# Patient Record
Sex: MALE | Race: WHITE
[De-identification: names, ages, dates, MRNs, and addresses within clinical notes are randomized per-mention and may not be internally consistent; named-entity substitution may affect disease eponyms.]

---

## 2017-10-25 NOTE — DIAGNOSTIC IMAGING REPORT
PET/CT SKULL-THIGH



HISTORY: Lymphoma  LYMPHOMA



TECHNIQUE: PET/CT was performed from the base of the skull through the pelvis

following the intravenous administration of 15.2 mCi of F18-FDG. Non-contrast CT

imaging was performed over the same range without breath-hold for attenuation

correction of PET images and anatomic correlation, but not for primary

interpretation as it is not of standard diagnostic quality.



CT DOSE:    

 

COMPARISON: None, although prior studies per report are present.



FINDINGS:

 

HEAD AND NECK:  There is no FDG-avid disease or significant lymphadenopathy in

the imaged portions of the head and the neck.

 

CHEST:  There is no FDG-avid disease in the chest.  There is no axillary,

mediastinal, or hilar lymphadenopathy.  There is no pleural or pericardial

effusion.  There is no air-space disease or suspicious lung nodule.

 

ABDOMEN/PELVIS:  Below the diaphragm, tracer is distributed physiologically in

the gastrointestinal and genitourinary tracts. There is no significant

lymphadenopathy and no FDG-avid disease.

 

MUSCULOSKELETAL:  There is no FDG-avid or destructive bone lesion.

 

IMPRESSION:

There is no definite evidence of recurrent FDG-avid disease. Despite absence of

prior studies for comparison, the current study shows no evidence for

metabolically active adenopathy.









The above report was generated using voice recognition software.  It may contain

grammatical, syntax or spelling errors.







Electronically signed by:  Abdiel Guzman M.D.

10/25/2017 10:17 AM



Dictated Date/Time:  10/25/2017 10:08 AM

## 2017-11-15 ENCOUNTER — HOSPITAL ENCOUNTER (OUTPATIENT)
Dept: HOSPITAL 45 - C.MRI | Age: 52
Discharge: HOME | End: 2017-11-15
Attending: INTERNAL MEDICINE
Payer: COMMERCIAL

## 2017-11-15 DIAGNOSIS — C82.18: Primary | ICD-10-CM

## 2017-11-15 NOTE — DIAGNOSTIC IMAGING REPORT
MRI OF THE BRAIN WITHOUT AND WITH IV CONTRAST



CLINICAL HISTORY: LYMPHOMA    



COMPARISON STUDY:  PET/CT scan dated 10/25/2017 



TECHNIQUE: MRI of the brain was performed from the vertex to the skull base

utilizing various T1 and T2 weighted sequences. Following the IV administration

of 9.5 mL of Gadavist contrast, additional enhanced images were obtained.



FINDINGS: 

Sagittal T1, axial diffusion, proton density and T2 weighted axial, coronal

FLAIR, and pre and post axial T1-weighted images were acquired. These were

supplemented with post gadolinium coronal T1 weighted images. 

No intra or extra-axial mass lesions are visualized.

Axial diffusion-weighted images reveal no evidence of acute or subacute

infarction.

There is no evidence of ventricular dilatation.

Proton density T2-weighted and FLAIR images reveal no significant

intraparenchymal signal abnormalities.

There are no abnormal flow voids.

There is no evidence of pathologic enhancement.





IMPRESSION:  Normal MRI of the brain for age. No evidence of intracranial

metastasis.







Electronically signed by:  Cheko Garcia M.D.

11/15/2017 10:11 AM



Dictated Date/Time:  11/15/2017 10:08 AM

## 2018-03-15 ENCOUNTER — HOSPITAL ENCOUNTER (OUTPATIENT)
Dept: HOSPITAL 45 - C.CTS | Age: 53
Discharge: HOME | End: 2018-03-15
Attending: INTERNAL MEDICINE
Payer: COMMERCIAL

## 2018-03-15 DIAGNOSIS — C82.18: Primary | ICD-10-CM

## 2018-03-15 NOTE — DIAGNOSTIC IMAGING REPORT
HEAD WITH CONTRAST (CT)



CLINICAL HISTORY: LYMPHOMA nodule



TECHNIQUE: Transaxial acquisition post contrast administration



COMPARISON STUDY:  None



FINDINGS: Normal density characteristics of the cerebellar as well as cerebral

hemispheres. Ventricular system is midline. No evidence for abnormal

postcontrast enhancement.



IMPRESSION:  Negative study 









The above report was generated using voice recognition software.  It may contain

grammatical, syntax or spelling errors.







Electronically signed by:  Abdiel Guzman M.D.

3/15/2018 3:43 PM



Dictated Date/Time:  3/15/2018 3:41 PM

## 2018-03-15 NOTE — DIAGNOSTIC IMAGING REPORT
CT SCAN OF THE NECK WITH IV CONTRAST



CLINICAL HISTORY: Lymphoma.



COMPARISON STUDY:  PET CT dated 10/25/2017.



TECHNIQUE: Following the IV administration of 93 cc of Optiray 320, CT scan of

the soft tissues of the neck was performed from the skull base to the upper

chest. Images are reviewed in the axial, sagittal, and coronal planes.  IV

contrast was administered without complication.  A dose lowering technique was

utilized adhering to the principles of ALARA.



FINDINGS:



Pharynx: The nasopharynx, oropharynx, and laryngeal pharynx are normal in

appearance. The pharyngeal airway is widely patent. There is no evidence of mass

lesion. The vocal cords are symmetric. The parapharyngeal fat is well

maintained. The prevertebral/retropharyngeal soft tissues are within normal

limits.



Lymphadenopathy: There is bulky bilateral cervical adenopathy, which has

significantly increased from 10/25/2017. The largest nodes are seen in the left

cervical radicular region. A node on image #203 measures 3.9 x 2.0 cm, and a

more superior node in the left inferior cervical region image #188 measures 3.2

x 2.4 cm. There are numerous enlarged right cervical lymph nodes. The largest is

seen on image #198 and measures 2.9 x 1.5 cm. Bulky axillary adenopathy is

partially visualized. The largest axillary node is seen on the right on image

#246 and measures 3.3 x 2.2 cm. No lymph nodes are identified in the superior

mediastinum.



Thyroid: Normal in size and attenuation. A subcentimeter low-attenuation nodule

is seen in the left lobe.



Salivary glands: The parotid and submandibular glands are within normal limits.



Brain parenchyma: The visualized brain parenchyma at the skull base is normal in

appearance.



Vascular structures: There is atherosclerotic calcification of the carotid

bulbs. The carotid arteries and jugular veins are widely patent. A left

subclavian central venous infusion port is in place. 



Skeletal structures: The skeletal structures are osteopenic. Imaged portions of

the calvarium at the skull base are within normal limits. The cervical spine

appears intact noting multilevel spondylosis..



Sinuses and mastoids: The visualized paranasal sinuses are clear. The mastoid

air cells are well pneumatized.



Lung apices: Visualized apical lung parenchyma is clear.





IMPRESSION:



1. There is bulky cervical and axillary lymphadenopathy as above. This has

markedly increased as compared to 10/25/2017 and is consistent with the reported

history of lymphoma.



2. The pharyngeal soft tissues are normal in appearance.







Electronically signed by:  Oli Walton M.D.

3/15/2018 3:58 PM



Dictated Date/Time:  3/15/2018 3:53 PM

## 2018-03-16 ENCOUNTER — HOSPITAL ENCOUNTER (OUTPATIENT)
Dept: HOSPITAL 45 - C.RAD1850 | Age: 53
Discharge: HOME | End: 2018-03-16
Attending: SURGERY
Payer: COMMERCIAL

## 2018-03-16 DIAGNOSIS — Z01.811: Primary | ICD-10-CM

## 2018-03-16 DIAGNOSIS — C85.90: ICD-10-CM

## 2018-03-16 DIAGNOSIS — R59.0: ICD-10-CM

## 2018-03-16 NOTE — DIAGNOSTIC IMAGING REPORT
CHEST 2 VIEWS ROUTINE



CLINICAL HISTORY: Lymphoma. Preoperative evaluation.    



COMPARISON STUDY:  PET/CT October 25, 2017.



FINDINGS: A left subclavian Jthazr-l-Upci is in place. There is no pneumothorax

or pleural effusion. There is no consolidation to suggest pneumonia. Pulmonary

vascularity is normal. Cardiomediastinal silhouette is unremarkable. 



IMPRESSION:  No acute cardiopulmonary findings. 









Electronically signed by:  Saroj Quinteros M.D.

3/16/2018 10:57 AM



Dictated Date/Time:  3/16/2018 10:56 AM

## 2018-03-21 ENCOUNTER — HOSPITAL ENCOUNTER (OUTPATIENT)
Dept: HOSPITAL 45 - C.ACU | Age: 53
Discharge: HOME | End: 2018-03-21
Attending: SURGERY
Payer: COMMERCIAL

## 2018-03-21 VITALS
DIASTOLIC BLOOD PRESSURE: 69 MMHG | HEART RATE: 90 BPM | SYSTOLIC BLOOD PRESSURE: 116 MMHG | OXYGEN SATURATION: 95 % | TEMPERATURE: 98.78 F

## 2018-03-21 VITALS
HEART RATE: 94 BPM | TEMPERATURE: 98.78 F | OXYGEN SATURATION: 95 % | DIASTOLIC BLOOD PRESSURE: 69 MMHG | SYSTOLIC BLOOD PRESSURE: 115 MMHG

## 2018-03-21 VITALS
DIASTOLIC BLOOD PRESSURE: 64 MMHG | TEMPERATURE: 98.06 F | OXYGEN SATURATION: 96 % | SYSTOLIC BLOOD PRESSURE: 125 MMHG | HEART RATE: 90 BPM

## 2018-03-21 VITALS
HEIGHT: 70.98 IN | WEIGHT: 213.45 LBS | BODY MASS INDEX: 29.88 KG/M2 | BODY MASS INDEX: 29.88 KG/M2 | HEIGHT: 70.98 IN | WEIGHT: 213.45 LBS

## 2018-03-21 DIAGNOSIS — E11.9: ICD-10-CM

## 2018-03-21 DIAGNOSIS — C83.31: Primary | ICD-10-CM

## 2018-03-21 DIAGNOSIS — Z83.3: ICD-10-CM

## 2018-03-21 DIAGNOSIS — Z82.49: ICD-10-CM

## 2018-03-21 DIAGNOSIS — I10: ICD-10-CM

## 2018-03-21 DIAGNOSIS — F17.210: ICD-10-CM

## 2018-03-21 DIAGNOSIS — Z79.82: ICD-10-CM

## 2018-03-21 DIAGNOSIS — Z88.0: ICD-10-CM

## 2018-03-21 DIAGNOSIS — I70.90: ICD-10-CM

## 2018-03-21 NOTE — HISTORY & PHYSICAL BRIDGE NOTE
H&P Re-Evaluation


Bridge Note:


I have examined the patient, reviewed the History & Physical and in the 

interval since the performance of the History & Physical I have noted the 

following changes of clinical significance: Patient confirms he is not taking 

plavix.  Receiving insulin for elevated glucose this AM.  No other changes noted

## 2018-03-21 NOTE — ANESTHESIOLOGY PROGRESS NOTE
Anesthesia Post Op Note


Date & Time


Mar 21, 2018 at 10:21





Vital Signs


Pain Intensity:  0





Vital Signs Past 12 Hours








  Date Time  Temp Pulse Resp B/P (MAP) Pulse Ox O2 Delivery O2 Flow Rate FiO2


 


3/21/18 10:15  93 16 122/70 97 Oxymask 3 


 


3/21/18 10:05  88 18 107/68 96 Oxymask 5 


 


3/21/18 09:55  89 18 103/69 96 Oxymask 5 


 


3/21/18 09:48 36.1 92 12 105/66 97 Oxymask 5 


 


3/21/18 07:00 36.7 90 18 125/64 (84) 96 Room Air  











Notes


Mental Status:  alert / awake / arousable, participated in evaluation


Pt Amnestic to Procedure:  Yes


Nausea / Vomiting:  adequately controlled


Pain:  adequately controlled


Airway Patency, RR, SpO2:  stable & adequate


BP & HR:  stable & adequate


Hydration State:  stable & adequate


Anesthetic Complications:  no major complications apparent

## 2018-03-21 NOTE — [facility] POST OPERATIVE BRIEF NOTE
Immediate Operative Summary


Operative Date


Mar 21, 2018.





Pre-Operative Diagnosis





Lymphadenopathy





Post-Operative Diagnosis





Lymphadenopathy





Procedure(s) Performed





Left Cervical Lymph Node Biopsy





Surgeon


Dr. Mccray





Assistant Surgeon(s)


Arya Ramírez PA-C





Estimated Blood Loss


7ML





Findings


Consistent with Post-Op Diagnosis


friable, matted lymph nodes





Specimens





A. Left supraclavicalar Lymph Nodes





Drains


None





Anesthesia Type


General





Complication(s)


none





Disposition


Accompanied Pt To Recover:  no


Disposition:  Recovery Room / PACU

## 2018-03-21 NOTE — [facility] OPERATIVE REPORT
Operative Report


Operative Date


Mar 21, 2018.





Pre-Operative Diagnosis





Lymphadenopathy, history of lymphoma





Post-Operative Diagnosis


Same





Procedure(s) Performed


Left cervical/supra clavicular lymph node biopsy





Surgeon


Dr. Mccray





Assistant Surgeon(s)


Arya Ramírez PA-C





Estimated Blood Loss


7ML





Findings


Multiple friable, matted lymph nodes.  One whole lymph node excised, another 

removed in multiple fragments.  Good hemostasis.





Specimens





A. Left supraclavicalar Lymph Nodes





Drains


None





Anesthesia


General





Complication(s)


None





Disposition


Recovery Room / PACU





Indications


52-year-old male with history of lymphoma status post treatment, now with new 

left-sided cervical and supraclavicular lymphadenopathy, plan for left cervical 

lymph node biopsy.  The risks of the procedure were discussed, all questions 

were answered, and the patient agreed to proceed with surgery as planned.





Description of Procedure


The patient was properly identified, consented, and taken to the operating room 

where he was placed in the supine position.  General endotracheal anesthesia 

was induced.  SCDs and a safety belt were placed.  Preoperative antibiotics 

were administered.  The patient's neck and chest was prepped and draped in the 

standard sterile fashion.  Surgical timeout was performed and all parties were 

in agreement that this was the correct patient and procedure to be performed 

and we continued as planned.  





Local anesthetic was injected along the skin incision.  A oblique incision was 

made overlying the left cervical/supraclavicular lymph nodes and deepened down 

through the subcutaneous tissue with electrocautery.  The platysma was divided.

  Small injury to a branch of the internal jugular vein was closed with a figure

-of-eight 4-0 Prolene suture.  There were multiple friable, matted lymph nodes 

posterior and deep to the SCM.  One lymph node was removed in fragments and the 

other lymph node was removed in total.  There was a capsular tear to an 

additional lymph node that was controlled with cautery.  The lymph nodes were 

excised, and passed off the table as specimen and sent fresh to pathology.  The 

wound was irrigated and hemostasis was confirmed.  The platysma was closed with 

interrupted 3-0 Vicryl sutures, the skin was closed with interrupted 3-0 Vicryl 

deep dermal sutures, followed by 4-0 Monocryl running subcuticular suture.  

Dermabond was placed over the wound.  





The patient was extubated in the operating room and taken to the PACU where he 

recovered without apparent incident.  All sponge, instrument and needle counts 

were correct at the conclusion of the procedure.  The patient tolerated the 

procedure well.








The physician's assistant was present and scrubbed for the entirety of the 

case.  He was essential in positioning the patient, prepping and draping, 

retraction and exposure, excision lymph nodes, closure of the incision, 

placement of the dressings.


I attest to the content of the Intraoperative Record and any orders documented 

therein.  Any exceptions are noted below.

## 2018-04-09 ENCOUNTER — HOSPITAL ENCOUNTER (OUTPATIENT)
Dept: HOSPITAL 45 - C.PET | Age: 53
Discharge: HOME | End: 2018-04-09
Attending: INTERNAL MEDICINE
Payer: COMMERCIAL

## 2018-04-09 DIAGNOSIS — C82.18: Primary | ICD-10-CM

## 2018-04-09 NOTE — DIAGNOSTIC IMAGING REPORT
PET/CT



HISTORY:      LYMPHOMA



TECHNIQUE: PET/CT was performed from the base of the skull through the pelvis

following the intravenous administration of 13.0 mCi of F18-FDG. Non-contrast CT

imaging was performed over the same range without breath-hold for attenuation

correction of PET images and anatomic correlation, but not for primary

interpretation as it is not of standard diagnostic quality.



CT DOSE:    

 

COMPARISON: PET CT 10/25/2017.

CT neck 3/15/2018.



FINDINGS:

 

HEAD AND NECK:  Symmetric FDG uptake within the brain. Multiple enlarged and FDG

avid cervical and superior ventricular lymph nodes. The majority of the cervical

lymph nodes are within the lower neck. Dominant left cervical lymph node

measures 3.4 x 2.4 cm and demonstrates an SUV max of 8.0.

 

CHEST:  Multiple enlarged and FDG avid bilateral axillary lymph nodes, right

greater than left. Dominant right cervical lymph node measures 3.5 x 2.7 cm and

demonstrates an SUV max of 8.8. There are few FDG avid mediastinal lymph nodes

with the largest in the superior mediastinum measuring 11 mm and demonstrating

an SUV max of 3.2. Mildly enlarged retrocrural lymph nodes also demonstrate

abnormal FDG uptake with an SUV max of 2.1.

 

ABDOMEN/PELVIS:  The spleen is enlarged measuring 15 m in length. This also

demonstrates abnormal FDG uptake with an SUV max of 4. This is new compared to

the prior study. No FDG avid hepatic masses. FDG avid retroperitoneal and pelvic

lymphadenopathy, right greater than left. A few enlarged FDG avid inguinal lymph

nodes. Dominant right pelvic sidewall lymph node measures 5.2 x 3.2 cm and

demonstrates an SUV max of 6.0.

 

MUSCULOSKELETAL:  There is no FDG-avid or destructive bone lesion.

 

IMPRESSION:  

1. Significant increase in size within multiple FDG avid lymph nodes seen

throughout the neck, chest, abdomen, and pelvis as described above. This is

consistent with worsening lymphoma.

2. Increase in size with abnormal FDG uptake within the spleen.







Electronically signed by:  Conor Espinoza M.D.

4/9/2018 11:57 AM



Dictated Date/Time:  4/9/2018 11:45 AM

## 2018-04-25 ENCOUNTER — HOSPITAL ENCOUNTER (OUTPATIENT)
Dept: HOSPITAL 45 - C.RAD | Age: 53
Discharge: HOME | End: 2018-04-25
Attending: INTERNAL MEDICINE
Payer: COMMERCIAL

## 2018-04-25 DIAGNOSIS — C82.18: Primary | ICD-10-CM

## 2018-04-25 NOTE — DIAGNOSTIC IMAGING REPORT
A-PORT CHECK



CLINICAL HISTORY: LYMPHOMA malfunctioning A-Port catheter



COMPARISON STUDY:  No previous studies for comparison.



FINDINGS: 6 seconds of fluoroscopic time was utilized. 15 fluoroscopic spot

images were acquired.



The patient's left A-Port catheter is positioned with its tip in the superior

vena cava at the azygos level.



The port was accessed by the radiology nurse.



15 cc of Optiray 300 was instilled through the catheter.



There was no obstruction to the flow of contrast. There is no extravasation.

Contrast filled the superior vena cava. A tiny fibrin sheath at the tip of the

catheter is suspected.



IMPRESSION:  

1. Tiny fibrin sheath at the tip of the catheter

2. No evidence of obstruction to the antegrade flow of contrast. No evidence of

extravasation 









Electronically signed by:  Cheko Garcia M.D.

4/25/2018 3:14 PM



Dictated Date/Time:  4/25/2018 3:13 PM

## 2018-08-09 ENCOUNTER — HOSPITAL ENCOUNTER (OUTPATIENT)
Dept: HOSPITAL 45 - C.LABSPEC | Age: 53
Discharge: HOME | End: 2018-08-09
Attending: INTERNAL MEDICINE
Payer: COMMERCIAL

## 2018-08-09 ENCOUNTER — HOSPITAL ENCOUNTER (INPATIENT)
Dept: HOSPITAL 45 - C.EDB | Age: 53
LOS: 1 days | Discharge: HOME | DRG: 638 | End: 2018-08-10
Attending: NURSE PRACTITIONER | Admitting: HOSPITALIST
Payer: COMMERCIAL

## 2018-08-09 VITALS — OXYGEN SATURATION: 97 %

## 2018-08-09 VITALS
BODY MASS INDEX: 29.72 KG/M2 | WEIGHT: 212.31 LBS | HEIGHT: 71 IN | HEIGHT: 71 IN | BODY MASS INDEX: 29.72 KG/M2 | BODY MASS INDEX: 29.72 KG/M2 | WEIGHT: 212.31 LBS

## 2018-08-09 VITALS — OXYGEN SATURATION: 98 %

## 2018-08-09 VITALS
SYSTOLIC BLOOD PRESSURE: 95 MMHG | TEMPERATURE: 98.06 F | HEART RATE: 84 BPM | DIASTOLIC BLOOD PRESSURE: 61 MMHG | OXYGEN SATURATION: 97 %

## 2018-08-09 VITALS
OXYGEN SATURATION: 96 % | TEMPERATURE: 98.06 F | SYSTOLIC BLOOD PRESSURE: 106 MMHG | HEART RATE: 92 BPM | DIASTOLIC BLOOD PRESSURE: 62 MMHG

## 2018-08-09 VITALS
TEMPERATURE: 97.7 F | HEART RATE: 89 BPM | OXYGEN SATURATION: 98 % | DIASTOLIC BLOOD PRESSURE: 56 MMHG | SYSTOLIC BLOOD PRESSURE: 99 MMHG

## 2018-08-09 DIAGNOSIS — E83.42: ICD-10-CM

## 2018-08-09 DIAGNOSIS — I48.91: ICD-10-CM

## 2018-08-09 DIAGNOSIS — C85.90: ICD-10-CM

## 2018-08-09 DIAGNOSIS — F32.9: ICD-10-CM

## 2018-08-09 DIAGNOSIS — C82.18: Primary | ICD-10-CM

## 2018-08-09 DIAGNOSIS — N17.9: ICD-10-CM

## 2018-08-09 DIAGNOSIS — E11.65: Primary | ICD-10-CM

## 2018-08-09 DIAGNOSIS — E87.1: ICD-10-CM

## 2018-08-09 DIAGNOSIS — F17.200: ICD-10-CM

## 2018-08-09 DIAGNOSIS — I10: ICD-10-CM

## 2018-08-09 DIAGNOSIS — I48.92: ICD-10-CM

## 2018-08-09 DIAGNOSIS — E86.0: ICD-10-CM

## 2018-08-09 DIAGNOSIS — I95.9: ICD-10-CM

## 2018-08-09 LAB
ALBUMIN SERPL-MCNC: 3.4 GM/DL (ref 3.4–5)
ALBUMIN SERPL-MCNC: 3.5 GM/DL (ref 3.4–5)
ALP SERPL-CCNC: 93 U/L (ref 45–117)
ALP SERPL-CCNC: 95 U/L (ref 45–117)
ALT SERPL-CCNC: 37 U/L (ref 12–78)
ALT SERPL-CCNC: 38 U/L (ref 12–78)
AST SERPL-CCNC: 33 U/L (ref 15–37)
AST SERPL-CCNC: 35 U/L (ref 15–37)
BASOPHILS # BLD: 0.06 K/UL (ref 0–0.2)
BASOPHILS # BLD: 0.09 K/UL (ref 0–0.2)
BASOPHILS NFR BLD: 0.7 %
BASOPHILS NFR BLD: 1 %
BUN SERPL-MCNC: 16 MG/DL (ref 7–18)
BUN SERPL-MCNC: 19 MG/DL (ref 7–18)
BUN SERPL-MCNC: 20 MG/DL (ref 7–18)
CALCIUM SERPL-MCNC: 8.2 MG/DL (ref 8.5–10.1)
CALCIUM SERPL-MCNC: 9.7 MG/DL (ref 8.5–10.1)
CALCIUM SERPL-MCNC: 9.8 MG/DL (ref 8.5–10.1)
CO2 SERPL-SCNC: 19 MMOL/L (ref 21–32)
CO2 SERPL-SCNC: 24 MMOL/L (ref 21–32)
CO2 SERPL-SCNC: 25 MMOL/L (ref 21–32)
CREAT SERPL-MCNC: 1.01 MG/DL (ref 0.6–1.4)
CREAT SERPL-MCNC: 1.52 MG/DL (ref 0.6–1.4)
CREAT SERPL-MCNC: 1.58 MG/DL (ref 0.6–1.4)
EOS ABS #: 0.17 K/UL (ref 0–0.5)
EOS ABS #: 0.19 K/UL (ref 0–0.5)
EOSINOPHIL NFR BLD AUTO: 141 K/UL (ref 130–400)
EOSINOPHIL NFR BLD AUTO: 150 K/UL (ref 130–400)
GLUCOSE SERPL-MCNC: 103 MG/DL (ref 70–99)
GLUCOSE SERPL-MCNC: 464 MG/DL (ref 70–99)
GLUCOSE SERPL-MCNC: 542 MG/DL (ref 70–99)
HCT VFR BLD CALC: 43.1 % (ref 42–52)
HCT VFR BLD CALC: 44 % (ref 42–52)
HGB BLD-MCNC: 15.7 G/DL (ref 14–18)
HGB BLD-MCNC: 15.8 G/DL (ref 14–18)
IG#: 0.32 K/UL (ref 0–0.02)
IG#: 0.35 K/UL (ref 0–0.02)
IMM GRANULOCYTES NFR BLD AUTO: 14.9 %
IMM GRANULOCYTES NFR BLD AUTO: 9.1 %
LIPASE: 185 U/L (ref 73–393)
LYMPHOCYTES # BLD: 0.79 K/UL (ref 1.2–3.4)
LYMPHOCYTES # BLD: 1.4 K/UL (ref 1.2–3.4)
MCH RBC QN AUTO: 32.1 PG (ref 25–34)
MCH RBC QN AUTO: 32.6 PG (ref 25–34)
MCHC RBC AUTO-ENTMCNC: 35.9 G/DL (ref 32–36)
MCHC RBC AUTO-ENTMCNC: 36.4 G/DL (ref 32–36)
MCV RBC AUTO: 89.4 FL (ref 80–100)
MCV RBC AUTO: 89.4 FL (ref 80–100)
MONO ABS #: 0.38 K/UL (ref 0.11–0.59)
MONO ABS #: 1.04 K/UL (ref 0.11–0.59)
MONOCYTES NFR BLD: 11.9 %
MONOCYTES NFR BLD: 4 %
NEUT ABS #: 6.31 K/UL (ref 1.4–6.5)
NEUT ABS #: 7.02 K/UL (ref 1.4–6.5)
NEUTROPHILS # BLD AUTO: 1.8 %
NEUTROPHILS # BLD AUTO: 2.2 %
NEUTROPHILS NFR BLD AUTO: 72.4 %
NEUTROPHILS NFR BLD AUTO: 74.6 %
PMV BLD AUTO: 9.1 FL (ref 7.4–10.4)
PMV BLD AUTO: 9.5 FL (ref 7.4–10.4)
POTASSIUM SERPL-SCNC: 3.9 MMOL/L (ref 3.5–5.1)
POTASSIUM SERPL-SCNC: 4.3 MMOL/L (ref 3.5–5.1)
POTASSIUM SERPL-SCNC: 4.6 MMOL/L (ref 3.5–5.1)
PROT SERPL-MCNC: 6.5 GM/DL (ref 6.4–8.2)
PROT SERPL-MCNC: 6.8 GM/DL (ref 6.4–8.2)
RED CELL DISTRIBUTION WIDTH CV: 15 % (ref 11.5–14.5)
RED CELL DISTRIBUTION WIDTH CV: 15.2 % (ref 11.5–14.5)
RED CELL DISTRIBUTION WIDTH SD: 49.5 FL (ref 36.4–46.3)
RED CELL DISTRIBUTION WIDTH SD: 49.7 FL (ref 36.4–46.3)
SODIUM SERPL-SCNC: 127 MMOL/L (ref 136–145)
SODIUM SERPL-SCNC: 128 MMOL/L (ref 136–145)
SODIUM SERPL-SCNC: 138 MMOL/L (ref 136–145)
WBC # BLD AUTO: 8.71 K/UL (ref 4.8–10.8)
WBC # BLD AUTO: 9.41 K/UL (ref 4.8–10.8)

## 2018-08-09 RX ADMIN — MAGNESIUM SULFATE IN DEXTROSE SCH MLS/HR: 10 INJECTION, SOLUTION INTRAVENOUS at 15:48

## 2018-08-09 RX ADMIN — MAGNESIUM SULFATE IN DEXTROSE SCH MLS/HR: 10 INJECTION, SOLUTION INTRAVENOUS at 15:40

## 2018-08-09 RX ADMIN — INSULIN ASPART SCH UNITS: 100 INJECTION, SOLUTION INTRAVENOUS; SUBCUTANEOUS at 22:04

## 2018-08-09 RX ADMIN — SODIUM CHLORIDE SCH MLS/HR: 900 INJECTION, SOLUTION INTRAVENOUS at 14:45

## 2018-08-09 RX ADMIN — METOPROLOL TARTRATE SCH MG: 100 TABLET, FILM COATED ORAL at 20:26

## 2018-08-09 RX ADMIN — ALUMINUM ZIRCONIUM TRICHLOROHYDREX GLY SCH EA: 0.2 STICK TOPICAL at 23:54

## 2018-08-09 RX ADMIN — INSULIN ASPART SCH UNITS: 100 INJECTION, SOLUTION INTRAVENOUS; SUBCUTANEOUS at 18:15

## 2018-08-09 RX ADMIN — SODIUM CHLORIDE SCH MLS/HR: 900 INJECTION, SOLUTION INTRAVENOUS at 22:06

## 2018-08-09 RX ADMIN — ALUMINUM ZIRCONIUM TRICHLOROHYDREX GLY SCH EA: 0.2 STICK TOPICAL at 15:41

## 2018-08-09 RX ADMIN — APIXABAN SCH MG: 5 TABLET, FILM COATED ORAL at 20:27

## 2018-08-09 RX ADMIN — NICOTINE SCH PATCH: 21 PATCH, EXTENDED RELEASE TRANSDERMAL at 18:10

## 2018-08-09 RX ADMIN — RANOLAZINE SCH MG: 500 TABLET, FILM COATED, EXTENDED RELEASE ORAL at 20:27

## 2018-08-09 NOTE — EMERGENCY ROOM VISIT NOTE
History


Report prepared by Beena:  No Murillo


Under the Supervision of:  Dr. Elvin Mendez M.D.


First contact with patient:  10:35


Chief Complaint:  REFERRED BY DOCTOR


Stated Complaint:  SENT FROM CANCER CENTER, HIGH SUGAR, LOW BP





History of Present Illness


The patient is a 53 year old male who presents to the Emergency Room with 

complaints of an episode of abnormal lab values beginning today. He was seen by 

Dr. Henson in the cancer center today, who referred to the ED for low sodium

, low BP, and high blood sugar.  The patient states he is very tired, and 

denies nausea, palpitations, CP, SOB fever, cough, abdominal pain, pain with 

urination, or blood in urine or stools. He notes his Metoprolol dose was 

recently doubled, and that he takes 30 units of Lantus nightly.





   Source of History:  patient


   Onset:  today


   Position:  other (lab values)


   Quality:  other (low sodium, low BP, high sugar)


   Timing:  other (episode)


   Associated Symptoms:  No fevers, No chest pain, No SOB, No melena, No 

urinary symptoms


Note:


Associated symptom: very tired





Review of Systems


See HPI for pertinent positives and negatives.  A total of ten systems were 

reviewed and were otherwise negative.





Past Medical & Surgical


Medical Problems:


(1) Diabetes


(2) Hyperglycemia


(3) Lymphoma








Diabetes





Family History


No pertinent family history stated.





Social History


Smoking Status:  Current Every Day Smoker


Marital Status:  


Housing Status:  lives with family





Current/Historical Medications


Scheduled


Apixaban (Eliquis), 5 MG PO BID


Atorvastatin (Lipitor), 80 MG PO HS


Clopidogrel (Plavix), 75 MG PO DAILY


Glyburide-Metformin (Glucovance 5/500 Mg), 2 TAB PO BID


Insulin Detemir (Levemir), 30 UNITS SC DAILY


Isosorbide Mononitrate (Isosorbide Mononitrate ER), 30 MG PO DAILY


Linagliptin (Tradjenta), 5 MG PO DAILY


Metoprolol Tartrate (Lopressor) (Lopressor), 100 MG PO BID


Ranolazine (Ranexa), 500 MG PO BID


Valsartan/Hctz (Diovan Hct 160MG/25MG), 1 TAB PO DAILY


Venlafaxine Hcl (Effexor Extended Rel), 150 MG PO DAILY





Scheduled PRN


Ondansetron Hcl (Zofran), 1 TAB PO UD PRN for Nausea





Allergies


Coded Allergies:  


     Penicillins (Verified  Allergy, Unknown, UNKNOWN, 8/9/18)





Physical Exam


Vital Signs











  Date Time  Temp Pulse Resp B/P (MAP) Pulse Ox O2 Delivery O2 Flow Rate FiO2


 


8/9/18 13:35  86 18  98 Room Air  


 


8/9/18 13:31    97/61    


 


8/9/18 13:05  87 23     


 


8/9/18 12:35  85 24     


 


8/9/18 12:30  85 18  98 Room Air  


 


8/9/18 12:07     97 Room Air  


 


8/9/18 12:01    87/63    


 


8/9/18 12:00  94 17     


 


8/9/18 11:45  86  104/59    





  96  105/67    





  98  93/65    


 


8/9/18 11:37    93/65    


 


8/9/18 11:36    104/59    





    105/67    


 


8/9/18 11:31    97/57    


 


8/9/18 11:30  93 40     


 


8/9/18 11:00  103 23 84/47    


 


8/9/18 10:54  101      


 


8/9/18 10:31 36.3 60 20 73/45 97 Room Air  











Physical Exam


Physical Exam 


GENERAL:  He is oriented to person, place, and time. He appears well-developed 

and well-nourished. He does not appear distressed.


HENT:  Exam performed. 


   Head:  Normocephalic and atraumatic. 


   Right Ear:  External ear normal. No mastoid tenderness. 


   Left Ear: External ear normal. No mastoid tenderness. 


   Mouth/Throat:  The oropharynx is clear and moist. No trismus in the jaw. No 

dental abscesses or uvula swelling. No oropharyngeal exudate or tonsillar 

abscesses.


EYES: Conjunctivae and EOM are normal. Pupils are equal, round, and reactive to 

light. Right eye exhibits no discharge. Left eye exhibits no discharge. No 

scleral icterus.


NECK: Normal range of motion. Neck supple. No JVD present. No spinous process 

tenderness present. No carotid bruit present. No rigidity. No tracheal 

deviation and normal range of motion present. No Brudzinski's sign and no Kernig

's sign noted.


CV: Normal rate, regular rhythm, normal heart sounds and intact distal pulses. 

There is no peripheral edema. Palpable radial pulses bue.


PULM/CHEST:  Effort normal and breath sounds normal. No respiratory distress. 

No stridor. He has no wheezes. He has no rales. 


   Chest Wall:  He exhibits no tenderness. Port on L side. 


ABD: The abdomen is soft. Bowel sounds are normal. He has no distension. No 

mass is present. There is no tenderness. There is no rebound, no guarding, no 

Morales's sign and no tenderness at McBurney's point. Rovsig negative.


MUSC/SKEL: Normal range of motion. There is no peripheral edema, tenderness or 

deformity.


LYMPH: No cervical adenopathy.


NEURO: He is alert and oriented to person, place, and time. He has normal 

strength. No cranial nerve deficit or sensory deficit. Coordination and gait 

normal. GCS eye subscore is 4. GCS verbal subscore is 5. GCS motor subscore is 

6. Cerebellar tests wnl.


SKIN: Skin is warm and dry. He is not diaphoretic.


PSYCH: He has a normal mood and affect. Behavior is normal. Judgment and 

thought content normal.





Medical Decision & Procedures


ER Provider


Diagnostic Interpretation:


Radiology results as stated below per my review and radiologist interpretation: 





CHEST ONE VIEW PORTABLE





CLINICAL HISTORY: 53 years-old Male presenting with cough hypotensive . 





TECHNIQUE: Portable upright AP view of the chest was obtained.





COMPARISON: 3/16/2018.





FINDINGS:


Left subclavian Mediport has been accessed and terminates in the upper SVC.


Cardiomediastinal silhouette normal allowing for AP technique. No focal opacity.


No large effusion or pneumothorax. Osseous structures normal. 





IMPRESSION:


1.  No acute cardiopulmonary disease.





Electronically signed by:  Luis Tubbs M.D.


8/9/2018 11:19 AM





Dictated Date/Time:  8/9/2018 11:18 AM





Laboratory Results


8/9/18 10:57








Red Blood Count 4.82, Mean Corpuscular Volume 89.4, Mean Corpuscular Hemoglobin 

32.6, Mean Corpuscular Hemoglobin Concent 36.4, Mean Platelet Volume 9.5, 

Neutrophils (%) (Auto) 72.4, Lymphocytes (%) (Auto) 9.1, Monocytes (%) (Auto) 

11.9, Eosinophils (%) (Auto) 2.2, Basophils (%) (Auto) 0.7, Neutrophils # (Auto

) 6.31, Lymphocytes # (Auto) 0.79, Monocytes # (Auto) 1.04, Eosinophils # (Auto

) 0.19, Basophils # (Auto) 0.06





8/9/18 10:57

















Test


  8/9/18


10:57 8/9/18


11:40 8/9/18


13:21 8/9/18


13:35


 


White Blood Count


  8.71 K/uL


(4.8-10.8) 


  


  


 


 


Red Blood Count


  4.82 M/uL


(4.7-6.1) 


  


  


 


 


Hemoglobin


  15.7 g/dL


(14.0-18.0) 


  


  


 


 


Hematocrit 43.1 % (42-52)    


 


Mean Corpuscular Volume


  89.4 fL


() 


  


  


 


 


Mean Corpuscular Hemoglobin


  32.6 pg


(25-34) 


  


  


 


 


Mean Corpuscular Hemoglobin


Concent 36.4 g/dl


(32-36) 


  


  


 


 


Platelet Count


  150 K/uL


(130-400) 


  


  


 


 


Mean Platelet Volume


  9.5 fL


(7.4-10.4) 


  


  


 


 


Neutrophils (%) (Auto) 72.4 %    


 


Lymphocytes (%) (Auto) 9.1 %    


 


Monocytes (%) (Auto) 11.9 %    


 


Eosinophils (%) (Auto) 2.2 %    


 


Basophils (%) (Auto) 0.7 %    


 


Neutrophils # (Auto)


  6.31 K/uL


(1.4-6.5) 


  


  


 


 


Lymphocytes # (Auto)


  0.79 K/uL


(1.2-3.4) 


  


  


 


 


Monocytes # (Auto)


  1.04 K/uL


(0.11-0.59) 


  


  


 


 


Eosinophils # (Auto)


  0.19 K/uL


(0-0.5) 


  


  


 


 


Basophils # (Auto)


  0.06 K/uL


(0-0.2) 


  


  


 


 


RDW Standard Deviation


  49.5 fL


(36.4-46.3) 


  


  


 


 


RDW Coefficient of Variation


  15.0 %


(11.5-14.5) 


  


  


 


 


Immature Granulocyte % (Auto) 3.7 %    


 


Immature Granulocyte # (Auto)


  0.32 K/uL


(0.00-0.02) 


  


  


 


 


Anion Gap


  10.0 mmol/L


(3-11) 


  


  


 


 


Est Creatinine Clear Calc


Drug Dose 65.7 ml/min 


  


  


  


 


 


Estimated GFR (


American) 59.3 


  


  


  


 


 


Estimated GFR (Non-


American 51.2 


  


  


  


 


 


BUN/Creatinine Ratio 12.8 (10-20)    


 


Calcium Level


  9.7 mg/dl


(8.5-10.1) 


  


  


 


 


Total Bilirubin


  0.5 mg/dl


(0.2-1) 


  


  


 


 


Direct Bilirubin


  0.2 mg/dl


(0-0.2) 


  


  


 


 


Aspartate Amino Transf


(AST/SGOT) 33 U/L (15-37) 


  


  


  


 


 


Alanine Aminotransferase


(ALT/SGPT) 37 U/L (12-78) 


  


  


  


 


 


Alkaline Phosphatase


  95 U/L


() 


  


  


 


 


Total Protein


  6.5 gm/dl


(6.4-8.2) 


  


  


 


 


Albumin


  3.4 gm/dl


(3.4-5.0) 


  


  


 


 


Lipase


  185 U/L


() 


  


  


 


 


Beta-Hydroxybutyric Acid


  1.84 mg/dL


(0.2-2.81) 


  


  


 


 


Procalcitonin


  0.07 ng/ml


(0-0.5) 


  


  


 


 


Urine Color  YELLOW   


 


Urine Appearance  CLEAR (CLEAR)   


 


Urine pH  5.0 (4.5-7.5)   


 


Urine Specific Gravity


  


  1.030


(1.000-1.030) 


  


 


 


Urine Protein  TRACE (NEG)   


 


Urine Glucose (UA)  3+ (NEG)   


 


Urine Ketones  NEG (NEG)   


 


Urine Occult Blood  NEG (NEG)   


 


Urine Nitrite  NEG (NEG)   


 


Urine Bilirubin  NEG (NEG)   


 


Urine Urobilinogen  NEG (NEG)   


 


Urine Leukocyte Esterase  NEG (NEG)   


 


Urine WBC (Auto)  1-5 /hpf (0-5)   


 


Urine RBC (Auto)  0-4 /hpf (0-4)   


 


Urine Hyaline Casts (Auto)  1-5 /lpf (0-5)   


 


Urine Epithelial Cells (Auto)


  


  10-20 /lpf


(0-5) 


  


 


 


Urine Bacteria (Auto)  NEG (NEG)   





Laboratory results reviewed by me





Medications Administered











 Medications


  (Trade)  Dose


 Ordered  Sig/Magdiel


 Route  Start Time


 Stop Time Status Last Admin


Dose Admin


 


 Sodium Chloride  1,000 ml @ 


 999 mls/hr  Q1H1M STAT


 IV  8/9/18 10:38


 8/9/18 11:38 DC 8/9/18 10:38


999 MLS/HR


 


 Sodium Chloride  1,000 ml @ 


 999 mls/hr  Q1H1M STAT


 IV  8/9/18 11:42


 8/9/18 12:42 DC 8/9/18 11:42


999 MLS/HR


 


 Vancomycin HCl


  (Vancomycin 1gm


 Ed/Asu Omnicell)  1 gm  NOW  STAT


 IV  8/9/18 11:49


 8/9/18 11:50 DC 8/9/18 12:22


1 GM


 


 Cefepime HCl 1000


 mg/Dextrose  111 ml @ 


 200 mls/hr  NOW  STAT


 IV  8/9/18 12:38


 8/9/18 13:11 DC 8/9/18 13:17


200 MLS/HR











ECG Per My Interpretation


Indication:  other (sepsis)


Rate (beats per minute):  93


Rhythm:  atrial fibrillation


Findings:  other (QRS and QTC intervals within normal limits, no ST elevation 

or depression)





ED Course


1037: The patient was evaluated in room C4. A complete history and physical 

exam was performed.





1038: Ordered Sodium Chloride 1000 ml @ 999 mls/hr IV.





1148: Blood pressure improved status post IV fluid bolus.  Labs show elevated 

lactate of 4.8.  Sodium 128.  Given the patient being on chemotherapy and 

having a poor, there is high probability of the patient having line sepsis.  

Discussed the patient's case with Dr. Henson, oncologist. He agrees with 

admittance for possible line sepsis. Vancomycin given, Natividad agrees with 

this plan.





1149: Ordered cefepime and vancomycin HCl 1 gm IV. 





1214: Discussed the patient's case with Dr. Lynn [facility] hospitalist. The 

patient will be evaluated for further treatment and disposition.





1216: I updated the patient. He is agreeable to the treatment plan.





Medical Decision


Blood pressure improved status post IV fluid bolus.  Labs show elevated lactate 

of 4.8.  Sodium 128.  Given the patient being on chemotherapy and having a poor

, there is high probability of the patient having line sepsis.  Discussed the 

patient's case with Dr. Henson, oncologist. He agrees with admittance for 

possible line sepsis. Vancomycin given, Natividad agrees with this plan.





Medication Reconcilliation


Current Medication List:  was personally reviewed by me





Blood Pressure Screening


Patient's blood pressure:  Low blood pressure


Blood pressure disposition:  Referred to PCP (referred to hospitalist)





Consults


Time Called:  1141


Consulting Physician:  Dr. Henson, oncologist


Returned Call:  1148


1148: Discussed the patient's case with Dr. Henson oncologist. He agrees 

with admittance for possible line sepsis. Vancomycin given, Natividad agrees 

with this plan.


Additional Consults:  


   Time Called:  1210


   Consulted Physician:  Dr. Lynn [facility] hospitalist


   Returned Call:  1214


Additional Comments:


1214: Discussed the patient's case with Dr. Lynn [facility] hospitalist. The 

patient will be evaluated for further treatment and disposition.





Impression





 Primary Impression:  


 Sepsis


 Additional Impression:  


 Line sepsis





Critical Care


I have personally spent greater than 80 minutes of critical care time in the 

direct management of this patient.  This includes bedside care, interpretation 

of diagnostic studies, and testing, discussion with consultants, patient, and 

family members, and other required patient management activities.  This 80 

minutes is in excess of all separately billable procedures.





Scribe Attestation


The scribe's documentation has been prepared under my direction and personally 

reviewed by me in its entirety. I confirm that the note above accurately 

reflects all work, treatment, procedures, and medical decision making performed 

by me.





The chart was completed utilizing Dragon Speech voice recognition software. 

Grammatical errors, random word insertions, pronoun errors, and incomplete 

sentences are an occasional consequence of this system due to software 

limitations, ambient noise, and hardware issues. Any formal questions or 

concerns about the content, text, or information contained within the body of 

this dictation should be directly addressed to the physician for clarification.





Departure Information


Dispostion


Being Evaluated By Hospitalist





Referrals


No Doctor, Assigned (PCP)





Patient Instructions


My [facility]





Sepsis


Post Crystalloid Evaluation


Date:  Aug 9, 2018


Time:  10:37





Capillary Refill Exam


Normal (less than 2 seconds)





Cardiopulmonary Evaluation


Lung Exam:  lungs clear, no respiratory distress


Heart Exam:  regular rate, rhythm





Central Venous Evaluation


Pulse Ox %:  100





Passive Leg Raise


Negative (normal)





Peripheral Pulse Evaluation


Normal





Skin Exam


Unremarkable





Vitals





Last Vital Signs Documentation








  Date Time  Temp Pulse Resp B/P (MAP) Pulse Ox O2 Delivery O2 Flow Rate FiO2


 


8/9/18 13:35  86 18  98 Room Air  


 


8/9/18 13:31    97/61    


 


8/9/18 10:31 36.3       











Presence Of


Severe Sepsis





Problem Qualifiers








 Primary Impression:  


 Sepsis


 Sepsis type:  sepsis due to unspecified organism  Qualified Codes:  A41.9 - 

Sepsis, unspecified organism


 Additional Impression:  


 Line sepsis


 Encounter type:  initial encounter  Qualified Codes:  T85.79XA - Infection and 

inflammatory reaction due to other internal prosthetic devices, implants and 

grafts, initial encounter; A41.9 - Sepsis, unspecified organism

## 2018-08-09 NOTE — HISTORY AND PHYSICAL
History & Physical


Date & Time of Service:


Aug 9, 2018 at 12:36


Chief Complaint:


Sent From [facility], High Sugar, Low Bp


Primary Care Physician:


Chirag Steiner M.D.


History of Present Illness


Mr. Kumar is accompanied by his mother and daughter. Today he was getting 

blood work at the cancer center in preparation for chemotherapy for his 

lymphoma.  His blood pressure was found to be low and bsg was elevated as well 

as hyponatremic so he was sent to the ED where his bp was 74/45 which came up 

to . He was more fatigued than usual but did not feel sick otherwise. Lately 

his blood sugars have been running very high, above 200, but not as high as 

today in the ED. He did have a bit of polydypsia but denies polyphagia or 

polyuria. 





ROS


Constitutional: no chills, aches, sweats or fever


Respiratory: no sob,cough, sputum, or wheezing


Cardiac: no chest pain, palpitations, edema, orthopnea or lightheadedness


GI: no abdominal pain, nausea, vomiting, diarrhea or constipation


: no dysuria or hesitancy


Extremities: no joint pain or weakness


Skin: no rash





All other systems reviewed and negative





Pmxh: lymphoma, atherosclerosis, A.fib, type II diabetes - takes metformin and 

Trajenta, stents - patient will have another cath or bypass in the fall,





Past Medical/Surgical History


Medical Problems:


(1) Diabetes


(2) Lymphoma








Family History


DM II, htn, and cardiac history, father  in an MVA





Social History


Smoking Status:  Current Every Day Smoker (currently in smoking cessation 

classes )


Smokeless Tobacco Use:  No


Alcohol Use:  none


Drug Use:  none


Marital Status:  


Housing status:  lives alone


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 

recieving chemo)





Allergies


Coded Allergies:  


     Penicillins (Verified  Allergy, Unknown, UNKNOWN, 18)





Home Medications


Scheduled


Apixaban (Eliquis), 5 MG PO BID


Atorvastatin (Lipitor), 80 MG PO HS


Clopidogrel (Plavix), 75 MG PO DAILY


Glyburide-Metformin (Glucovance 5/500 Mg), 2 TAB PO BID


Insulin Detemir (Levemir), 30 UNITS SC DAILY


Isosorbide Mononitrate (Isosorbide Mononitrate ER), 30 MG PO DAILY


Linagliptin (Tradjenta), 5 MG PO DAILY


Metoprolol Tartrate (Lopressor) (Lopressor), 100 MG PO BID


Ranolazine (Ranexa), 500 MG PO BID


Valsartan/Hctz (Diovan Hct 160MG/25MG), 1 TAB PO DAILY


Venlafaxine Hcl (Effexor Extended Rel), 150 MG PO DAILY





Scheduled PRN


Ondansetron Hcl (Zofran), 1 TAB PO UD PRN for Nausea





Physical Exam


Vital Signs











  Date Time  Temp Pulse Resp B/P (MAP) Pulse Ox O2 Delivery O2 Flow Rate FiO2


 


18 12:30  85 18  98 Room Air  


 


18 12:07     97 Room Air  


 


18 12:01    87/63    


 


18 12:00  94 17     


 


18 11:45  86  104/59    





  96  105/67    





  98  93/65    


 


18 11:37    93/65    


 


18 11:36    104/59    





    105/67    


 


18 11:31    97/57    


 


18 11:30  93 40     


 


18 11:00  103 23 84/47    


 


18 10:54  101      


 


18 10:31 36.3 60 20 73/45 97 Room Air  








General: no distress


Eyes: normal inspection, PERLL


Respiratory: chest non tender, clear to auscultation, normal breath sounds, no 

respiratory distress, no accessory muscle use


Cardiac: regular rate and rhythm, no rub or gallop, no murmur, no edema, no jvd


GI/: active bowel sounds, no abd pain or tenderness, soft, non distended


Extremities: normal range of motion, normal strength, non tender 


Neuro/Psych: alert and oriented x 3, normal mood and affect


Skin: normal color, dry





Diagnostics


Laboratory Results





Results Past 24 Hours








Test


  18


10:57 18


11:40 18


12:26 Range/Units


 


 


White Blood Count 8.71   4.8-10.8  K/uL


 


Red Blood Count 4.82   4.7-6.1  M/uL


 


Hemoglobin 15.7   14.0-18.0  g/dL


 


Hematocrit 43.1   42-52  %


 


Mean Corpuscular Volume 89.4     fL


 


Mean Corpuscular Hemoglobin 32.6   25-34  pg


 


Mean Corpuscular Hemoglobin


Concent 36.4


  


  


  32-36  g/dl


 


 


Platelet Count 150   130-400  K/uL


 


Mean Platelet Volume 9.5   7.4-10.4  fL


 


Neutrophils (%) (Auto) 72.4    %


 


Lymphocytes (%) (Auto) 9.1    %


 


Monocytes (%) (Auto) 11.9    %


 


Eosinophils (%) (Auto) 2.2    %


 


Basophils (%) (Auto) 0.7    %


 


Neutrophils # (Auto) 6.31   1.4-6.5  K/uL


 


Lymphocytes # (Auto) 0.79   1.2-3.4  K/uL


 


Monocytes # (Auto) 1.04   0.11-0.59  K/uL


 


Eosinophils # (Auto) 0.19   0-0.5  K/uL


 


Basophils # (Auto) 0.06   0-0.2  K/uL


 


RDW Standard Deviation 49.5   36.4-46.3  fL


 


RDW Coefficient of Variation 15.0   11.5-14.5  %


 


Immature Granulocyte % (Auto) 3.7    %


 


Immature Granulocyte # (Auto) 0.32   0.00-0.02  K/uL


 


Sodium Level 128   136-145  mmol/L


 


Potassium Level 4.6   3.5-5.1  mmol/L


 


Chloride Level 95     mmol/L


 


Carbon Dioxide Level 24   21-32  mmol/L


 


Anion Gap 10.0   3-11  mmol/L


 


Blood Urea Nitrogen 19   7-18  mg/dl


 


Creatinine


  1.52


  


  


  0.60-1.40


mg/dl


 


Est Creatinine Clear Calc


Drug Dose 65.7


  


  


   ml/min


 


 


Estimated GFR (


American) 59.3


  


  


   


 


 


Estimated GFR (Non-


American 51.2


  


  


   


 


 


BUN/Creatinine Ratio 12.8   10-20  


 


Random Glucose 464   70-99  mg/dl


 


Lactic Acid Level 4.8   0.4-2.0  mmol/L


 


Calcium Level 9.7   8.5-10.1  mg/dl


 


Magnesium Level 1.5   1.8-2.4  mg/dl


 


Total Bilirubin 0.5   0.2-1  mg/dl


 


Direct Bilirubin 0.2   0-0.2  mg/dl


 


Aspartate Amino Transf


(AST/SGOT) 33


  


  


  15-37  U/L


 


 


Alanine Aminotransferase


(ALT/SGPT) 37


  


  


  12-78  U/L


 


 


Alkaline Phosphatase 95     U/L


 


Total Protein 6.5   6.4-8.2  gm/dl


 


Albumin 3.4   3.4-5.0  gm/dl


 


Lipase 185     U/L


 


Beta-Hydroxybutyric Acid 1.84   0.2-2.81  mg/dL


 


Urine Color  YELLOW   


 


Urine Appearance  CLEAR  CLEAR  


 


Urine pH  5.0  4.5-7.5  


 


Urine Specific Gravity  1.030  1.000-1.030  


 


Urine Protein  TRACE  NEG  


 


Urine Glucose (UA)  3+  NEG  


 


Urine Ketones  NEG  NEG  


 


Urine Occult Blood  NEG  NEG  


 


Urine Nitrite  NEG  NEG  


 


Urine Bilirubin  NEG  NEG  


 


Urine Urobilinogen  NEG  NEG  


 


Urine Leukocyte Esterase  NEG  NEG  


 


Urine WBC (Auto)  1-5  0-5  /hpf


 


Urine RBC (Auto)  0-4  0-4  /hpf


 


Urine Hyaline Casts (Auto)  1-5  0-5  /lpf


 


Urine Epithelial Cells (Auto)  10-20  0-5  /lpf


 


Urine Bacteria (Auto)  NEG  NEG  








Microbiology Results


18 Blood Culture, Received


         Pending


18 Blood Culture, Received


         Pending


CXR normal





EKG


Atrial flutter with variable A-V block


Left posterior fascicular block


Abnormal ECG


No previous ECGs available





Impression


Assessment and Plan


Mr. Kumar is a 53 year old man here for hypotension and abnormal labs. 





There is some concern for infection given that the patient is currently 

undergoing chemotherapy and lactic acid was elevated and patient was 

hypotensive. However given overall picture - no leukocytosis, no fever, no cough

, sob, skin changes - it seems more likely that this is dehydration due to 

hyperglycemia.  





Hyperglycemia


- admit med/surg 


- bsgs ac & hs, pharmacy glycemic management consult 


- NSS @ 125 mls/hr


- repeat prp this evening, prp am 


- potassium wnl 





Dehydration, JEANE


- creat 1.52, baseline around 0.9


- repeat prp am


- IVF as above 


- hold losartan/hctz





Elevated lactic acid


- 4.8, will repeat, likely secondary to dehydration from hyperglycemia 


- BC x 2 drawn - will hold off on further antibiotics unless positive result, 

attempted to draw sample from port however patient reports that samples may not 

be taken from port.  


- procalcitonin wnl 





Hypomagnesemia


- mag 1.5


- IV magnesium x 2g 





Hyponatremia


- 128, when corrected for hyperglycemia 134 


- repeat prp this afternoon


- IVF as above 


- hold losartan/hctz





Lymphoma


- patient sees Dr. Henson


- patient was scheduled for chemo 





Hx A.fib/ A.flutter


- A.flutter on EKG, rate 80s-90s


- continue home apixaban, metoprolol





Hx CAD, htn


- Continue home ranolazine, metoprolol, 


- hold losartan/hctz as above 





Hx Depression


- continue home venlafaxine 





Full code


DVT proph - apixaban NP Physician Supervision Note:





I discussed with Pao Lopez NP and agree with findings and plan as 

documented in the note. Any exceptions or clarifications are listed here: None





this pt was in oncology and was found to have markedly elevated glucose, with 

low sodium that corrects, he denies dietary and medical indiscretion.  the pt 

had low blood pressure and was given fluid resuscitation with improved blood 

pressure in the ER.  Pt was given vancomycin in the ER but infection is 

clinically not apparent





Documented By:  Florian Lama





Advanced Directives


Existing Living Will:  Yes


Existing Power of :  Yes (Ciarra (daughter))





Resuscitation Status


full code





VTE Prophylaxis


Will order VTE Prophylaxis:  Yes

## 2018-08-09 NOTE — DIAGNOSTIC IMAGING REPORT
CHEST ONE VIEW PORTABLE



CLINICAL HISTORY: 53 years-old Male presenting with cough hypotensive . 



TECHNIQUE: Portable upright AP view of the chest was obtained.



COMPARISON: 3/16/2018.



FINDINGS:

Left subclavian Mediport has been accessed and terminates in the upper SVC.

Cardiomediastinal silhouette normal allowing for AP technique. No focal opacity.

No large effusion or pneumothorax. Osseous structures normal. 



IMPRESSION:

1.  No acute cardiopulmonary disease.







Electronically signed by:  Luis Tubbs M.D.

8/9/2018 11:19 AM



Dictated Date/Time:  8/9/2018 11:18 AM

## 2018-08-09 NOTE — PHARMACY PROGRESS NOTE
Glycemic Control Intl Consult


Date of Service


Aug 9, 2018.





Scope


Glycemic Pharmacist consulted by Froilan RAMOS on 8/9/18 for glycemic control 

and to write orders per [facility] inpatient glycemic control protocol





Objective


Weight (Kilograms):  96.200


Accuchecks BSG (last 24hrs):











Test


  8/9/18


10:57 8/9/18


14:06


 


Random Glucose


  464 mg/dl


(70-99) 


 


 


Bedside Glucose


  


  223 mg/dl


(70-99)








Laboratory Data (last 24hrs)











Test


  8/9/18


10:57


 


Anion Gap 10.0 mmol/L 


 


BUN/Creatinine Ratio 12.8 


 


Blood Urea Nitrogen 19 mg/dl 


 


Creatinine 1.52 mg/dl 


 


Potassium Level 4.6 mmol/L 


 


Sodium Level 128 mmol/L 


 


White Blood Count 8.71 K/uL 


 


Red Blood Count 4.82 M/uL 


 


Hemoglobin 15.7 g/dL 


 


Hematocrit 43.1 % 


 


Mean Corpuscular Volume 89.4 fL 


 


Mean Corpuscular Hemoglobin 32.6 pg 


 


Mean Corpuscular Hemoglobin


Concent 36.4 g/dl 


 


 


Platelet Count 150 K/uL 


 


Mean Platelet Volume 9.5 fL 


 


Neutrophils (%) (Auto) 72.4 % 


 


Lymphocytes (%) (Auto) 9.1 % 


 


Monocytes (%) (Auto) 11.9 % 


 


Eosinophils (%) (Auto) 2.2 % 


 


Basophils (%) (Auto) 0.7 % 


 


Neutrophils # (Auto) 6.31 K/uL 


 


Lymphocytes # (Auto) 0.79 K/uL 


 


Monocytes # (Auto) 1.04 K/uL 


 


Eosinophils # (Auto) 0.19 K/uL 


 


Basophils # (Auto) 0.06 K/uL 











Recent Pertinent Medications


Outpatient Anti-diabetic Regimen: 


* Levemir 30 units SQ qPM, linagliptin 5 mg PO daily, glyburide/metformin (5/

500 mg) 2 tabs PO BID


* A1c unknown - pending





Assessment & Plan


ASSESSMENT:


* 53 yr old T2DM male admitted for hypotension and hyperglycemia. Patient was 

ref[facility] where he was having lab work done in 

preparation for chemotherapy. h/o lymphoma, A.fib, and CAD.


* BSG of 464 mg/dL in the ED. BSG improved to 223 mg/dL after three hours after 

IV hydration. 


* Patient is on basal insulin at home plus oral agents. Outpatient glycemic 

control unknown - A1c pending for tomorrow. Will hold oral agents for admission 

and utilize SQ basal bolus insulin regimen which is the recommended regimen for 

inpatient glycemic control.





PLAN FOR INPATIENT GLYCEMIC CONTROL:





* Holding outpatient oral diabetes medications





* Basal insulin


 * Levemir 30-36 units SQ qPM


 * 30 units if BSG is less than 180


 * 36 units if BSG is 180 or greater





* Correctional Insulin


 * NOVOLOG per scale ACHS or Q6hrs while NPO


 * Goal Range:  Low 110 mg/dL - High 140 mg/dL


 * Correction Factor: 25 mg/dL/unit


 * Nutritional / Prandial insulin per carb ratio of 1 unit per 9 grams CHO 

consumed








* Please note that the plan above was derived based on current level of insulin 

resistance and hospital stress. These recommendations are appropriate for 

inpatient admission only. Plan of care upon discharge will need to be 

reassessed to avoid potential outpatient hypo/hyperglycemia. 





Thank you.

## 2018-08-10 VITALS
TEMPERATURE: 97.7 F | OXYGEN SATURATION: 98 % | HEART RATE: 83 BPM | DIASTOLIC BLOOD PRESSURE: 79 MMHG | SYSTOLIC BLOOD PRESSURE: 121 MMHG

## 2018-08-10 VITALS
SYSTOLIC BLOOD PRESSURE: 124 MMHG | OXYGEN SATURATION: 96 % | DIASTOLIC BLOOD PRESSURE: 80 MMHG | HEART RATE: 76 BPM | TEMPERATURE: 97.7 F

## 2018-08-10 VITALS
SYSTOLIC BLOOD PRESSURE: 124 MMHG | DIASTOLIC BLOOD PRESSURE: 80 MMHG | HEART RATE: 76 BPM | OXYGEN SATURATION: 96 % | TEMPERATURE: 97.7 F

## 2018-08-10 VITALS
DIASTOLIC BLOOD PRESSURE: 60 MMHG | SYSTOLIC BLOOD PRESSURE: 95 MMHG | HEART RATE: 83 BPM | TEMPERATURE: 98.24 F | OXYGEN SATURATION: 97 %

## 2018-08-10 LAB
BUN SERPL-MCNC: 11 MG/DL (ref 7–18)
CALCIUM SERPL-MCNC: 7.9 MG/DL (ref 8.5–10.1)
CO2 SERPL-SCNC: 21 MMOL/L (ref 21–32)
CREAT SERPL-MCNC: 0.81 MG/DL (ref 0.6–1.4)
EOSINOPHIL NFR BLD AUTO: 114 K/UL (ref 130–400)
GLUCOSE SERPL-MCNC: 126 MG/DL (ref 70–99)
HBA1C MFR BLD: 8.2 % (ref 4.5–5.6)
HCT VFR BLD CALC: 39 % (ref 42–52)
HGB BLD-MCNC: 13.6 G/DL (ref 14–18)
MCH RBC QN AUTO: 31.6 PG (ref 25–34)
MCHC RBC AUTO-ENTMCNC: 34.9 G/DL (ref 32–36)
MCV RBC AUTO: 90.5 FL (ref 80–100)
PMV BLD AUTO: 9 FL (ref 7.4–10.4)
POTASSIUM SERPL-SCNC: 4.3 MMOL/L (ref 3.5–5.1)
RED CELL DISTRIBUTION WIDTH CV: 15.4 % (ref 11.5–14.5)
RED CELL DISTRIBUTION WIDTH SD: 51.4 FL (ref 36.4–46.3)
SODIUM SERPL-SCNC: 138 MMOL/L (ref 136–145)
WBC # BLD AUTO: 6.3 K/UL (ref 4.8–10.8)

## 2018-08-10 RX ADMIN — INSULIN ASPART SCH UNITS: 100 INJECTION, SOLUTION INTRAVENOUS; SUBCUTANEOUS at 13:09

## 2018-08-10 RX ADMIN — INSULIN ASPART SCH UNITS: 100 INJECTION, SOLUTION INTRAVENOUS; SUBCUTANEOUS at 09:08

## 2018-08-10 RX ADMIN — ALUMINUM ZIRCONIUM TRICHLOROHYDREX GLY SCH EA: 0.2 STICK TOPICAL at 07:24

## 2018-08-10 RX ADMIN — METOPROLOL TARTRATE SCH MG: 100 TABLET, FILM COATED ORAL at 08:05

## 2018-08-10 RX ADMIN — SODIUM CHLORIDE SCH MLS/HR: 900 INJECTION, SOLUTION INTRAVENOUS at 13:01

## 2018-08-10 RX ADMIN — NICOTINE SCH PATCH: 21 PATCH, EXTENDED RELEASE TRANSDERMAL at 08:07

## 2018-08-10 RX ADMIN — SODIUM CHLORIDE SCH MLS/HR: 900 INJECTION, SOLUTION INTRAVENOUS at 05:21

## 2018-08-10 RX ADMIN — APIXABAN SCH MG: 5 TABLET, FILM COATED ORAL at 08:05

## 2018-08-10 RX ADMIN — RANOLAZINE SCH MG: 500 TABLET, FILM COATED, EXTENDED RELEASE ORAL at 08:05

## 2018-08-10 NOTE — DISCHARGE SUMMARY
Discharge Summary


Date of Service


Aug 10, 2018.





Discharge Summary


Admission Date:


Aug 9, 2018 at 13:32


Discharge Date:  Aug 10, 2018


Discharge Disposition:  Home


Principal Diagnosis:  Hyperglycemia, dehydration


Problems/Secondary Diagnoses:


Elevated lactic acid, Hypomagnesemia, Hyponatremia, Lymphoma, Hx A.fib/ 

A.flutter, Hx CAD, htn, Hx Depression


Procedures:


CHEST ONE VIEW PORTABLE





CLINICAL HISTORY: 53 years-old Male presenting with cough hypotensive . 





TECHNIQUE: Portable upright AP view of the chest was obtained.





COMPARISON: 3/16/2018.





FINDINGS:


Left subclavian Mediport has been accessed and terminates in the upper SVC.


Cardiomediastinal silhouette normal allowing for AP technique. No focal opacity.


No large effusion or pneumothorax. Osseous structures normal. 





IMPRESSION:


1.  No acute cardiopulmonary disease.











Electronically signed by:  Luis Tubbs M.D.


8/9/2018 11:19 AM





Medication Reconciliation


Continued Medications:  


Apixaban (Eliquis) 5 Mg Tab


5 MG PO BID





Atorvastatin (Lipitor) 40 Mg Tab


80 MG PO HS


TWO 40 MG TABLETS AT BEDTIME


Clopidogrel (Plavix) 75 Mg Tab


75 MG PO DAILY





Glyburide-Metformin (Glucovance 5/500 Mg) 1 Tab Tab


2 TAB PO BID





Insulin Detemir (Levemir) 100 Units/Ml Inj


30 UNITS SC DAILY





Isosorbide Mononitrate (Isosorbide Mononitrate ER) 30 Mg Tabcr


30 MG PO DAILY





Linagliptin (Tradjenta) 5 Mg Tab


5 MG PO DAILY





Metoprolol Tartrate (Lopressor) (Lopressor) 100 Mg Tab


100 MG PO BID





Ondansetron Hcl (Zofran) Unknown Strength Tab


1 TAB PO UD PRN for Nausea





Ranolazine (Ranexa) 500 Mg Tabcr


500 MG PO BID





Valsartan/Hctz (Diovan Hct 160MG/25MG) 1 Tab Tab


1 TAB PO DAILY





Venlafaxine Hcl (Effexor Extended Rel) 150 Mg Cap


150 MG PO DAILY











Discharge Exam


ROS


Constitutional: no chills, aches, sweats or fever


Respiratory: no sob,cough, sputum, or wheezing


Cardiac: no chest pain, palpitations, edema, orthopnea or lightheadedness


GI: no abdominal pain, nausea, vomiting, diarrhea or constipation


: no dysuria or hesitancy


Extremities: no joint pain or weakness


Skin: no rash





All other systems reviewed and negative





General: no distress


Eyes: normal inspection, PERLL


Respiratory: chest non tender, clear to auscultation, normal breath sounds, no 

respiratory distress, no accessory muscle use


Cardiac: regular rate and rhythm, no rub or gallop, no murmur, no edema, no jvd


GI/: active bowel sounds, no abd pain or tenderness, soft, non distended


Extremities: normal range of motion, normal strength, non tender 


Neuro/Psych: alert and oriented x 3, normal mood and affect


Skin: normal color, dry





Hospital Course


Mr. Kumar was sent to the ED from the cancer center after his blood work in 

preparation for chemotherapy for his lymphoma came back abnormal.  His blood 

pressure was found to be low and bsg was elevated and he was hyponatremic.  In 

the ED his blood pressure was 74/45 which came up to normal with fluid 

resuscitation . He was more fatigued than usual but did not feel sick 

otherwise. Lately his blood sugars have been running very high, above 200, but 

not as high as today in the ED. He did have a bit of polydypsia but denied 

polyphagia or polyuria. 





There was some concern for infection given that the patient is currently 

undergoing chemotherapy and lactic acid was elevated and patient was 

hypotensive. However given overall picture - no leukocytosis, no fever, no cough

, sob, or skin changes - and today patient's significant recovery with fluids 

and treatment of hyperglycemia, it is apparent that he was dehydrated due to 

hyperglycemia. 





Hyperglycemia


- bsgs ac & hs, pharmacy glycemic management consult 


- Patient was given NSS @ 125 mls/hr 


- blood sugars ranging from 104-238 today


- potassium wnl 


- for home, patient will continue with his normal regimen except he will add 5 

units Levemir if his morning or evening blood sugar check is 200-300 and he 

will ad 10 units Levemir if it is 300-400.  He should call his primary if 

higher than 400





Dehydration, JEANE


- creat 1.52 on admission, now at baseline


- IVF as above 





Elevated lactic acid


- 4.8 on admission and then trended down with fluid resuscitation, secondary to 

dehydration from hyperglycemia 


- BC x 2 drawn - ngtd


- procalcitonin wnl 





Hypomagnesemia


- mag 1.6 today 


- IV magnesium x 2g  yesterday and another 1g today plus will start patient on 

oral magnesium supplement 





Hyponatremia


- 128 on admission but when corrected for hyperglycemia actually 134, today 138





Lymphoma


- patient sees Dr. Henson


- patient was scheduled for chemo 8/9





Hx A.fib/ A.flutter


- A.flutter on EKG, rate 80s-90s


- continue home apixaban, metoprolol





Hx CAD, htn


- Continue home ranolazine, metoprolol, Diovan 





Hx Depression


- continue home venlafaxine 





NP Physician Supervision Note:





I discussed with Pao Lopez NP and agree with findings and plan as 

documented in the note. Any exceptions or clarifications are listed here: None





Documented By:  Florian Lama


Total Time Spent:  Greater than 30 minutes


This includes examination of the patient, discharge planning, medication 

reconciliation, and communication with other providers.





Discharge Instructions


Please refer to the electronic Patient Visit Report (Discharge Instructions) 

for additional information.





Follow-Up


pcp, oncology





Additional Copies To


Chirag Steiner M.D.

## 2018-08-10 NOTE — PHARMACY PROGRESS NOTE
Glycemic Control Progress Note


Date of Service


Aug 10, 2018.





Scope


Glycemic Pharmacist consulted for glycemic control to write orders per [facility] 

inpatient glycemic control protocol.





Objective


Accuchecks BSG (last 24hrs):











Test


  8/9/18


14:06 8/9/18


17:16 8/9/18


21:54 8/9/18


22:14


 


Bedside Glucose


  223 mg/dl


(70-99) 187 mg/dl


(70-99) 107 mg/dl


(70-99) 


 


 


Random Glucose


  


  


  


  103 mg/dl


(70-99)


 


Test


  8/10/18


03:33 8/10/18


06:04 8/10/18


11:36 


 


 


Bedside Glucose


  128 mg/dl


(70-99) 


  238 mg/dl


(70-99) 


 


 


Random Glucose


  


  126 mg/dl


(70-99) 


  


 








HbA1c:











Test


  8/10/18


06:04


 


Hemoglobin A1c


  8.2 %


(4.5-5.6)  H











Outpatient Anti-Diabetic Meds


Outpatient Anti-diabetic Regimen: 


* Levemir 30 units SQ qPM


* linagliptin 5 mg PO daily


* glyburide/metformin (5/500 mg) 2 tabs PO BID





Assessment & Plan


ASSESSMENT:


8/9/18:


* 53 yr old T2DM male admitted for hypotension and hyperglycemia. Patient was 

referred to the ED from [facility] where he was having lab work done in 

preparation for chemotherapy. h/o lymphoma, A.fib, and CAD.


* BSG of 464 mg/dL in the ED. BSG improved to 223 mg/dL after three hours after 

IV hydration. 


* Patient is on basal insulin at home plus oral agents. Outpatient glycemic 

control unknown - A1c pending for tomorrow. Will hold oral agents for admission 

and utilize SQ basal bolus insulin regimen which is the recommended regimen for 

inpatient glycemic control.





8/10/18:


* Patient received a total of 47 units of insulin yesterday. BSGs improved 

greatly over the past 12 hours.


* Fasting BSG of 126 mg/dL is at goal. Continue patients home dose of Levemir.


* I loosened the Novolog CF and CR to 30/11 this am due to BSGs trending 

downward last evening (187-> 107-> 103), however, after doing so his lunch BSG 

became elevated. I will change CF back to 25 but leave CR as is for now. 





PLAN FOR INPATIENT GLYCEMIC CONTROL:





* Holding outpatient oral diabetes medications





* Basal insulin


 * Continue Levemir 30-36 units SQ qPM


 * 30 units if BSG is less than 180


 * 36 units if BSG is 180 or greater





* Correctional Insulin- loosen CR


 * NOVOLOG per scale ACHS or Q6hrs while NPO


 * Goal Range:  Low 110 mg/dL - High 140 mg/dL


 * Correction Factor: 25 mg/dL/unit


 * Nutritional / Prandial insulin per carb ratio of 1 unit per 11 grams CHO 

consumed





DISCHARGE RECOMMENDATIONS:


* A1c of 8.2 % is suboptimal.


* Continue to titrate Levemir dose as fasting BSG allows. 


* Patient already has f/u with PCP scheduled for 8/13/18.





Thank you.

## 2018-08-10 NOTE — DISCHARGE INSTRUCTIONS
Discharge Instructions


Date of Service


Aug 10, 2018.





Admission


Reason for Admission:  Hyperglycemia





Discharge


Discharge Diagnosis / Problem:  Hyperglycemia





Discharge Goals


Goal(s):  Improve disease control





Activity Recommendations


Activity Limitations:  resume your previous activity





.





Instructions / Follow-Up


Instructions / Follow-Up


Please take your blood sugar 4 times per day before meals and before bed. For 

the morning and bedtime doses only please make the following changes:


If your blood sugar is 200-300 give 5 extra units of Levemir (insulin detemir)


If your blood sugar is 300-400 give 10 extra units of Levemir (insulin detemir)








If any of your blood sugar readings are over 400 call your primary care 

provider. Keep a log of your values and take it with your to your primary care 

appointment 





Please follow up with your primary care provider within about a week








Current Hospital Diet


Patient's current hospital diet: AHA Diet (Heart Healthy), Diabetes Type 2 Diet





Discharge Diet


Recommended Diet:  AHA Diet (Heart Healthy), Diabetes Type 2 Diet





Procedures


Procedures Performed:  


Chest x ray





Pending Studies


Studies pending at discharge:  no





Laboratory Results





Hemoglobin A1c








Test


  8/10/18


06:04 Range/Units


 


 


Estimated Average Glucose 189   mg/dl


 


Hemoglobin A1c 8.2 H 4.5-5.6  %











Medical Emergencies








.


Who to Call and When:





Medical Emergencies:  If at any time you feel your situation is an emergency, 

please call 911 immediately.





.





Non-Emergent Contact


Non-Emergency issues call your:  Primary Care Provider


Call Non-Emergent contact if:  you have any medication questions





.


.








"Provider Documentation" section prepared by Pao Lopez.








.

## 2019-01-25 NOTE — DISCHARGE INSTRUCTIONS
Discharge Instructions


Date of Service


Mar 21, 2018.





Visit


Reason for Visit:  Lymphoma, Multiple Sites, Diabetes





Discharge


Discharge Diagnosis / Problem:  lymph node biopsy





Discharge Goals


Goal(s):  Decrease discomfort





Activity Recommendations


Activity Limitations:  as noted below


Driving or Machine Use:  no limitations





Anesthesia


.





Post Anesthesia Instructions:





If you have had General Anesthesia or IV Sedation:





*  Do not drive today.


*  Resume driving when surgeon permits.


*  Do not make important decisions or sign legal documents today.


*  Call surgeon for:





   1.  Temperature elevations greater than 101 degrees F.


   2.  Uncontrollable pain.


   3.  Excessive bleeding.


   4.  Persistent nausea and vomiting.


   5.  Medication intolerance (nausea, vomiting or rash).





*  For nausea and vomiting use only clear liquids such as: tea, soda, bouillon 

until nausea subsides, then gradually increase diet as tolerated.





*  If you have any concerns or questions, call your surgeon's office.  If 

physician is unavailable and it is an emergency, call 911 or go to the nearest 

emergency room.





.





Instructions / Follow-Up


Instructions / Follow-Up


Dr. Mccray in 1-2 weeks as planned, call [phone] for any questions





Diet Recommendations


Recommended Home Diet:  no limitations





Procedures


Procedures Performed:  


Left Cervical Lymph Node Biopsy





Pending Studies


Studies pending at discharge:  yes


List of pending studies:  


pathology





Medical Emergencies








.


Who to Call and When:





Medical Emergencies:  If at any time you feel your situation is an emergency, 

please call 911 immediately.





.





Non-Emergent Contact


Non-Emergency issues call your:  Surgeon


Call Non-Emergent contact if:  you have a fever, temperature is above 101.5, 

your pain is not controlled, wound has increased redness, you have any 

medication questions





.


.








"Provider Documentation" section prepared by Arya Ramírez.








. No